# Patient Record
(demographics unavailable — no encounter records)

---

## 2024-10-21 NOTE — REASON FOR VISIT
[Follow-Up] : a follow-up visit [Asthma] : asthma [TextBox_44] : Overweight, HTN Cardiac disease,s/p hypokalemia

## 2024-10-21 NOTE — DISCUSSION/SUMMARY
[FreeTextEntry1] : Post for scheduled breast mastectomy redo plastic surgery did very well Will likely need umbilicus reconstruction pending Will see patient in December for clearance   Patient has had an issue on diuretic with hypokalemia Serum potassium has been increased Repeat potassium normal as noted above  There are no medical pulmonary contraindications to undergo scheduled above-noted procedures  Patient should also be treated with DVT PE protection heparin based on prior history of embolic disease No recent use of systemic corticosteroids Status post cardiology review no contraindications   Hypothyroid  inc synthroid 150  mcg QD HLD on generic  crestor Asthma hx COVID  Hx RSV Behavioral health  Disorder depression / anxiety Overweight  feels with weight will help self estem and overall feeling  exercise  avoid ETOH watch and trend weight Continue to monitor She has rescue inhaler Notify of any wheezing.  Notify if rescue inhaler is needed greater than 2-3 times in any week.  Avoid known triggers.

## 2024-10-21 NOTE — PHYSICAL EXAM
[Normal Oropharynx] : normal oropharynx [II] : Mallampati Class: II [Normal Appearance] : normal appearance [Supple] : supple [No JVD] : no jvd [Normal Rate/Rhythm] : normal rate/rhythm [Normal S1, S2] : normal s1, s2 [No Murmurs] : no murmurs [No Rubs] : no rubs [No Gallops] : no gallops [No Resp Distress] : no resp distress [No Acc Muscle Use] : no acc muscle use [Normal Palpation] : normal palpation [Normal Rhythm and Effort] : normal rhythm and effort [Clear to Auscultation Bilaterally] : clear to auscultation bilaterally [No Abnormalities] : no abnormalities [Benign] : benign [Not Tender] : not tender [Soft] : soft [No HSM] : no hsm [Normal Bowel Sounds] : normal bowel sounds [Normal Gait] : normal gait [No Clubbing] : no clubbing [No Cyanosis] : no cyanosis [No Edema] : no edema [No Focal Deficits] : no focal deficits [Oriented x3] : oriented x3 [Normal Affect] : normal affect

## 2024-10-21 NOTE — PROCEDURE
[FreeTextEntry1] : NIOX 24 normal range October 21, 2024 October 21, 2024 Spirometry flow rates are normal No decline in overall pulmonary physiology  NIOX 23 normal range July 8, 2024 Spirometry July 8, 2024 Normal spirometric flow rates No bronchodilator sponsor in FEV1 Preop surgical testing CBC normal range WBC 7.56 hemoglobin 13.5 hematocrit 39.9 platelet count 280,000 Serum electrolytes normal Creatinine 0.67 Serum potassium 3.3   NIOX  21  ppb WNL  3/4/24  PFT 3/4/24  [place] Nl flow rates  No BD at FEV!  Lung Volumes nl range  DLCO 120 % pred WNL HGB 14.2 stable / normal pulmonary physiology  Blood draw to recheck serum potassium Received preop laboratory data Noted serum potassium 3.1 Renal function normal creatinine 0.84 CBC normal Chest x-ray 3 March 4, 2024 Cardiac size normal AICD identified Sutures overlying left upper chest wall No parenchymal infiltrate pleural effusion or dominant pulmonary nodule Soft tissue bony structures unremarkable   NIOX  38 ppb hx asthma 12/18/23  [place] No BD 12/18/23  normal flow rates  NIOX  24  ppb WNL  8/28/23 [place] No BD  nl flow rates  PFT 5/18/23  flow  rates nl  No BD at FEV!  Lung Volumes  nl  DLCO 114 % HGB 13.2 NIOX  30  ppb mild  bronchial inflammation  Chest x-ray PA lateral May 18, 2023 Cardiac size normal Positive left upper lateral sutures identified post breast surgery AICD defibrillator in place Lung fields are clear No parenchymal infiltrates pleural effusions or dominant pulmonary nodules   Spirometry no bronchodilator January 5,2023 Flow rates normal Mild obstructive ventilatory impairment Ex 29 PPD January 5, 2023 consistent with mild bronchial inflammation  PFT 2/26/22 Well preserved flow rates mild OAD ratio 75 TLC 1213 % pred  Lung volumes nl DLCO 99 % nl HGB 13.7  EKG February 2, 2022 Heart rate of 104 Otherwise normal no acute changes no arrhythmias  Chest x-ray PA lateral February 2, 2022 Normal cardiac size Sutures overlying left chest wall consistent with prior breast surgery Single-chamber AICD identified Clear lung fields No evidence of parenchymal infiltrates pleural effusions dominant pulmonary nodules No evidence for residual Covid pneumonia   Chest x-ray PA lateral October 19, 2020 Normal cardiac size Sutures overlying left upper chest wall Single-chamber cardiac device most consistent with AICD Implants identified Clear lungs without parenchymal infiltrates pleural effusions dominant pulmonary nodules No evidence for adenopathy in the hilum appreciated Impression AICD   nitric oxide 62 2/25 2020  PFT February 25, 2020 Spirometry normal flow rates Mild obstructive pattern Borderline 10% response to bronchodilator at the FEV1 Lung volumes normal range Diffusion normal 170% predicted. Hemoglobin 13.4  Chest x-ray PA lateral projection February 25, 2020 Normal cardiac size Sutures left upper overlying chest Single-chamber cardiac device consistent with an AICD Overall clear lung fields without parenchymal infiltrates pleural effusions dominant pulmonary nodules Soft tissue bony structures grossly normal Shruthi mediastinum normal

## 2024-10-21 NOTE — HISTORY OF PRESENT ILLNESS
[Never] : never [TextBox_4] : 60 -year-old female Status post weight loss Did have follow-up with GI Patient did complete her breast reconstruction She will need possible additional plastic surgery for embolic umbilicus reconstruction as well Asthma is doing well without issues Chronic persistent asthma no exacerbations flare systemic steroids Patient is scheduled for plastic surgery redo breast mastectomy Patient will also undergo AICD replacement Patient has an issue with hypokalemia States she spoke to the plastic surgical team who said they can preop give her an IV infusion of serum potassium if needed Obesity but states doing well with phentermine Depression component of anxiety interment has helped improve his symptomatology as well as her overall mental status and work activities with better focus  CAD ASHD management cardiology Cardiac arrhythmias defibrillator History of RSV History of a COVID-19 infection History of breast cancer breast reconstruction Hypertension Hypothyroidism pos  weight loss on Wegovy

## 2024-10-21 NOTE — REVIEW OF SYSTEMS
[Fatigue] : fatigue [Depression] : depression [Anxiety] : anxiety [Negative] : Dermatologic [Cough] : no cough [Hemoptysis] : no hemoptysis [Chest Tightness] : no chest tightness [Sputum] : no sputum [Wheezing] : no wheezing [Diabetes] : no diabetes [TextBox_44] : cardiac  arrythmia [TextBox_113] : Breast Cancer not  active [TextBox_144] : Hypothyroidism

## 2024-12-18 NOTE — ASSESSMENT
[FreeTextEntry1] : Prior note nurse practitioner Sammy April 20, 2023::  This is a 58-year-old female with past medical history significant for hypertension, "new left bundle branch block", asthma, mild coronary artery calcifications from CAT scan January 5, 2022, obesity, status post COVID-19 infection October 2021, in November 2022,, hypothyroidism, history of supraventricular tachycardia, history of DVT (followed by vascular surgery), history of breast cancer status post bilateral mastectomy, status post hysterectomy, who comes in for cardiac consultation.  She denies chest pain, shortness of breath, dizziness or syncope. She has no history of rheumatic fever.  She does not drink excessive caffeine or alcohol. Cardiac risk factors include hypertension, and family history of atherosclerotic heart disease (a father had a heart attack at age 46).  She is followed by Dr. Alston pulmonary medicine.  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  CHIEF COMPLAINT: Today she is feeling generally well but does admit that she does have occasional chest pressure/chest pains but thinks this may be related to stress.  She is also interested in weight loss injectable medication.  Her BMI 32.49.  She may be a candidate for Wegovy at this time.  She states that she has a strong family history for coronary artery disease and is very interested in her risk for coronary artery disease.  She is currently prescribed aspirin 81 mg daily, diltiazem  mg 1 capsule daily, furosemide 40 mg daily and rosuvastatin 20 mg daily  She denies fever, chills, weight loss, malaise, rash, alteration bowel habits, weakness, abdominal  pain, bloating, changes in urination, visual disturbances, chest pain, headaches, dizziness, heart palpitations, recent episodes of syncope or falls at this time.  BLOOD PRESSURE: -BP is borderline elevated on today's exam however she states that she is having some stress work related at this time.  BLOOD WORK: -New blood work was done 04/20/2023 to evaluate lipid profile, CBC, BMP, hepatic function, A1C and TSH. -Blood work was done February 15, 2023 demonstrated total cholesterol of 255, triglycerides 142 HDL 63 and direct LDL of 160. Lipoprotein a elevated at 148.9 lipoprotein B elevated at 125 TSH elevated at 4.67.  C-reactive protein elevated 12.76.  Her A1c 5.7%.  TESTING/REPORTS: -EKG done Apr 20, 2023 which demonstrated regular sinus rhythm with nonspecific ST-T wave changes, BPM of 90 with LBBB.  -Electrocardiogram done February 15, 2023 demonstrates normal sinus rhythm rate 94 bpm is otherwise remarkable for left bundle branch block, left axis deviation, left atrial abnormality and poor R wave progression.  PMH: The patient was complaining of left-sided chest wall pain February 9, 2023.  The pain was worse with respiration and she went to urgent care.  She was sent to the emergency room at [facility] and she was found to have a new left bundle branch block. Chest pain necessitating visit to urgent care and ultimately the emergency room last week was musculoskeletal in nature.  Every time she took her breast she felt the chest pain.  She also had some pain in the scapular area suggestive of musculoskeletal pain as well.  She feels tightness in her chest sometimes related to the bilateral mastectomy and reconstruction surgery.  She had a pharmacologic nuclear stress test and echo Doppler examination during her hospitalization and was sent home for outpatient follow-up and evaluation.  PMH: The patient received a defibrillator/pacemaker in 2004 by Dr. Tang at [facility].  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  She reports that she was given a defibrillator because 1 sister had a cardiac arrest and ultimately both sisters were diagnosed with cardiomyopathy.  The patient was also found to have an episode of nonsustained ventricular tachycardia. She is followed by Dr. Puckett of electrophysiology. The patient reports that she is unable to take beta-blocker therapy secondary to severe asthma. The patient was concerned about the new left bundle branch block noted during the hospitalization of February 9, 2023. I have reassured her that she has a pacemaker defibrillator in place and she would follow-up with the electrophysiology team for monitoring, and she does have a functional right bundle branch to conduct her electrical signals. The patient also said at some point she thought she had "atrial fibrillation", but I did not find any such history in her record other than episode of paroxysmal atrial tachycardia. I have asked her to discuss this further with Dr. Puckett.  PLAN: -The patient will proceed with a CCTA with FFR to evaluate her risk for coronary artery disease.  Patient states she did have a pharmacologic nuclear stress test done during her hospitalization however we do not have these records to review at this time. -We will start the process for her to be on Wegovy and she understands to call our office once the pharmacy approves the authorization for initial demonstration of the 0.25 mg dose. -She still has not followed up with electrophysiologist Dr. Puckett as directed by Dr. Rosas however is not symptomatic at this time in regards to palpitations. -The patient will schedule an Echo Doppler examination to evaluate murmur, left ventricular function, chamber size, and rule out hypertrophy.  -She will continue with her usual medications and will contact the office if she is having any complaints between now and their next follow up appointment.  I have discussed the plan of care with Ms. LEIGHA GEE  and she  will follow up in 3 months. She is compliant with all of her medications. The patient understands that aerobic exercises must be increased to at least 20 minutes 4 times/week along with maintaining lifestyle modifications including well-maintained diet. She will contact me at the office for any questions with their care or any changes in their health status.  Wisam BRUNO

## 2024-12-18 NOTE — DISCUSSION/SUMMARY
[FreeTextEntry1] : This is a 60-year-old female with past medical history significant for coronary artery calcification, elevated lipoprotein a (203 nmol/L October 1, 2024), hypertension, "new left bundle branch block", which apparently has resolved, asthma, mild coronary artery calcifications from CAT scan January 5, 2022, obesity, status post COVID-19 infection October 2021, in November 2022,, status post AICD, hypothyroidism, history of supraventricular tachycardia, history of DVT (followed by vascular surgery), history of breast cancer status post bilateral mastectomy, status post hysterectomy, who comes in for preoperative cardiac evaluation. The patient is scheduled for revision of bellybutton by plastic surgery in the next month. The patient had revision of breast implantation, and improvement in her AICD pocket on March 13, 2024. She denies chest pain, shortness of breath, dizziness or syncope. Cardiac risk factors include hypertension, and family history of atherosclerotic heart disease (a father had a heart attack at age 46). Electrocardiogram done December 17, 2024 demonstrated normal sinus rhythm rate 75 bpm is otherwise remarkable for 1 premature ventricular contraction and nonspecific ST wave changes. Lipid panel done October 1, 2024 demonstrated cholesterol 164, HDL 74, triglycerides 106, LDL calculated 72, non-HDL cholesterol 91 mg/dL, LDL direct 73 mg/dL and lipoprotein B of 77 mg/dL with hemoglobin A1c of 5.1.  Lipoprotein a was elevated at 203 nmol/L. Echo Doppler examination done October 1, 2024 demonstrated satisfactory left ventricular function with estimated ejection fraction of 59%, with a range of 55 to 60%, mild mitral valve regurgitation, mild tricuspid valve regurgitation, trace aortic valve regurgitation, trace pulmonic valve regurgitation, device lead seen in the right heart and into atrial septal wall aneurysm.  The patient will have new blood work done today for electrolytes, lipid panel, TSH, hemoglobin A1c and SMA 20. The patient is lost approximately 70 pounds on Wegovy therapy currently at a 2.4 mg dosage weekly.  She continues to watch her diet and is working with weight watchers to maintain a prudent caloric intake. Her blood pressure is under good control The patient had successful surgery July 19, 2024 by her plastic surgeon related to breast revision and abdominal hernia repair. Electrocardiogram done June 25, 2024 demonstrated normal sinus rhythm rate of 87 bpm is otherwise remarkable for left atrial abnormality and 1 premature ventricular contraction. The patient will follow-up with Dr. Puckett of electrophysiology. She is currently hemodynamically stable and asymptomatic from a cardiac standpoint. Electrocardiogram done January 23, 2024 demonstrated normal sinus rhythm rate of 86 bpm otherwise remarkable for 1 premature ventricular contraction. Echo Doppler examination done July 19, 2023 demonstrated estimated ejection fraction of 55% with minimal tricuspid valve regurgitation, minimal to mild mitral valve regurgitation, thickened mitral valve leaflets, thinning of the interatrial septum, device lead seen in the right heart and thickening of the aortic valve leaflets. Blood work done March 4, 2024 demonstrated potassium of 3.3, sodium 141, chloride of 100, CO2 25, creatinine 0.73, GFR 95 cc/min. She is currently hemodynamically stable and asymptomatic from a cardiac standpoint. Electrocardiogram done October 3, 2023 demonstrated normal sinus rhythm rate of 82 bpm and is otherwise unremarkable. Lipid panel done April 20, 2023 demonstrated a cholesterol 179, HDL 63, triglycerides 159, LDL direct of 89 mg/dL and non-HDL cholesterol 116 mg/dL with hemoglobin A1c of 5.6. She will continue on her current diet and exercise program. Cardiac risk factors include hypertension, and family history of atherosclerotic heart disease (a father had a heart attack at age 46). Coronary CTA done August 2, 2023 which demonstrated a calcium score of 92 units all in the left anterior descending artery, and angiography analysis demonstrated minimal calcified plaque in the proximal left anterior descending artery of less than 25%, and mild plaque in the first diagonal branch. The patient will continue on her current diet and exercise program.   Echo Doppler examination done July 19, 2023 demonstrated minimal to mild mitral valve regurgitation, minimal tricuspid valve regurgitation, thinning of the interatrial septum, with estimated ejection fraction of 55%. Electrocardiogram done August 1, 2023 demonstrates normal sinus rhythm rate 92 bpm is otherwise unremarkable.  (Left bundle branch block has resolved) She remain on her dose of Crestor 20 mg daily.  Lipid profile done April 20, 2023 demonstrated cholesterol 170, HDL 63, triglycerides 159, LDL direct 89, non-HDL cholesterol 116 mg/dL and hemoglobin A1c of 5.6. Echo Doppler examination done July 19, 2023 demonstrated satisfactory left ventricular function with estimated ejection fraction of 55% and a range of 55 to 60%, minimal to mild mitral valve regurgitation, minimal tricuspid valve regurgitation, minimal thickened aortic valve leaflets and mitral valve leaflets with thinning of the interatrial septum.  A device lead was noted in the right heart.  Electrocardiogram done February 15, 2023 demonstrates normal sinus rhythm rate 94 bpm is otherwise remarkable for left bundle branch block, left axis deviation, left atrial abnormality and poor R wave progression. PMH: The patient was complaining of left-sided chest wall pain February 9, 2023.  The pain was worse with respiration and she went to urgent care.  She was sent to the emergency room at [facility] and she was found to have a new left bundle branch block.  The patient received a defibrillator/pacemaker in 2004 by Dr. Tang at [facility].  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  She reports that she was given a defibrillator because 1 sister had a cardiac arrest and ultimately both sisters were diagnosed with cardiomyopathy.  The patient was also found to have an episode of nonsustained ventricular tachycardia. She is followed by Dr. Puckett of electrophysiology. The patient reports that she is unable to take beta-blocker therapy secondary to severe asthma. The patient was concerned about the new left bundle branch block noted during the hospitalization of February 9, 2023.  I have reassured her that she has a pacemaker defibrillator in place and she would follow-up with the electrophysiology team for monitoring, and she does have a functional right bundle branch to conduct her electrical signals. The patient also said at some point she thought she had "atrial fibrillation", but I did not find any such history in her record other than episode of paroxysmal atrial tachycardia.   The patient is clear from a cardiac standpoint for surgery.  Please note the patient has an AICD in place and you may contact her electrophysiology team. She has satisfactory left ventricular function with estimated ejection fraction of 59%.  Please avoid overhydration.  Maintain prophylaxis for deep venous thrombosis.  The patient should have an incentive spirometer in the perioperative period.

## 2024-12-18 NOTE — REASON FOR VISIT
[FreeTextEntry3] : Dr. Garcia Trust [FreeTextEntry1] : This is a 60-year-old female who comes in for follow-up cardiac evaluation.  The patient had successful breast and AICD reconstruction surgery at [facility] on 3/13/24, as well as July 2024. Pt is here for preoperative clearance for umbilical repair, belly button, and breast reconstruction scheduled for 1/8/25 at [facility] with Dr. Isabel. Past medical history significant for hypertension, left bundle branch block, asthma, mild coronary artery calcifications from CAT scan 1/5/22, status post COVID-19 infection October 2021, in November 2022, hypothyroidism, history of supraventricular tachycardia, status post AICD placement 2016 complicated by DVT (followed by vascular surgery), history of breast cancer status post bilateral mastectomy, status post hysterectomy.  Cardiac risk factors include hypertension, and family history of atherosclerotic heart disease (a father had a heart attack at age 46). Social history: She works as a registered nurse at [employer]. She does not drink excessive caffeine or alcohol. She is followed by Dr. Alston pulmonary medicine. She denies chest pain, shortness of breath, dizziness or syncope.  She is currently prescribed Potassium chloride 10 MEQ, Aspirin 81 mg daily, Diltiazem  mg 1 capsule daily, Furosemide 40 mg daily, Rosuvastatin 20 mg daily, and Wegovy 2.4 mg weekly.   She has lost over 45 pounds since starting Wegovy in May 2023.   EKG 1/23/24 demonstrated sinus rhythm with rate 86 and one PVC.   Pt is cleared for surgery on cardiac standpoint.

## 2024-12-19 NOTE — HISTORY OF PRESENT ILLNESS
[Never] : never [TextBox_4] : 60 -year-old female no asthma  sxs PREOP  plastic surgery for umbilical repair Status post weight loss Did have follow-up with GI Patient did complete her breast reconstruction She will need possible additional plastic surgery for embolic umbilicus reconstruction as well Asthma is doing well without issues Chronic persistent asthma no exacerbations flare systemic steroids Patient is scheduled for plastic surgery redo breast mastectomy Patient will also undergo AICD replacement Patient has an issue with hypokalemia States she spoke to the plastic surgical team who said they can preop give her an IV infusion of serum potassium if needed Obesity but states doing well with phentermine Depression component of anxiety interment has helped improve his symptomatology as well as her overall mental status and work activities with better focus  CAD ASHD management cardiology Cardiac arrhythmias defibrillator History of RSV History of a COVID-19 infection History of breast cancer breast reconstruction Hypertension Hypothyroidism pos  weight loss on Wegovy

## 2024-12-19 NOTE — DISCUSSION/SUMMARY
[FreeTextEntry1] : Post for scheduled breast mastectomy redo plastic surgery did very well Will likely need umbilicus reconstruction pending PATIENT CLEARED PULMONARY FOR PLASTIC  SURGERY  Patient has had an issue on diuretic with hypokalemia Serum potassium has been increased Repeat potassium normal as noted above  There are no medical pulmonary contraindications to undergo scheduled above-noted procedures  Patient should also be treated with DVT PE protection heparin based on prior history of embolic disease No recent use of systemic corticosteroids Status post cardiology review no contraindications   Hypothyroid  inc synthroid 150  mcg QD HLD on generic  crestor Asthma hx COVID  Hx RSV Behavioral health  Disorder depression / anxiety Overweight  feels with weight will help self estem and overall feeling  exercise  avoid ETOH watch and trend weight Continue to monitor She has rescue inhaler Notify of any wheezing.  Notify if rescue inhaler is needed greater than 2-3 times in any week.  Avoid known triggers.

## 2025-03-13 NOTE — DISCUSSION/SUMMARY
[FreeTextEntry1] : Device function is normal with adequate safety margins.  =0%.    9.4 years  f/u remote in 6 mothhs  1 year in [facility]

## 2025-03-13 NOTE — PROCEDURE
[de-identified] : Abbott [de-identified] : Gabriel [de-identified] : [id number] [de-identified] : 3/13/2024

## 2025-03-13 NOTE — DISCUSSION/SUMMARY
[FreeTextEntry1] : Device function is normal with adequate safety margins.  =0%.    9.4 years  f/u remote in 6 mothhs  1 year in [facility]

## 2025-03-13 NOTE — PROCEDURE
[de-identified] : Abbott [de-identified] : Gabriel [de-identified] : [id number] [de-identified] : 3/13/2024

## 2025-03-13 NOTE — DISCUSSION/SUMMARY
[FreeTextEntry1] : Device function is normal with adequate safety margins.  =0%.    9.4 years  f/u remote in 6 mothhs  1 year in [facility]

## 2025-03-13 NOTE — PROCEDURE
[de-identified] : Abbott [de-identified] : Gabriel [de-identified] : [id number] [de-identified] : 3/13/2024

## 2025-04-14 NOTE — HISTORY OF PRESENT ILLNESS
[Never] : never [TextBox_4] : 60 -year-old female no asthma  sxs\plan additional plastic  surgery breast reconstruction f/u Post op plastic surgery for umbilical repair Status post weight loss Did have follow-up with GI Patient did complete her breast reconstruction She will need possible additional plastic surgery for embolic umbilicus reconstruction as well Asthma is doing well without issues Chronic persistent asthma no exacerbations flare systemic steroids Patient is scheduled for plastic surgery redo breast mastectomy Patient will also undergo AICD replacement Patient has an issue with hypokalemia States she spoke to the plastic surgical team who said they can preop give her an IV infusion of serum potassium if needed Obesity but states doing well with phentermine Depression component of anxiety interment has helped improve his symptomatology as well as her overall mental status and work activities with better focus  CAD ASHD management cardiology Cardiac arrhythmias defibrillator History of RSV History of a COVID-19 infection History of breast cancer breast reconstruction Hypertension Hypothyroidism pos  weight loss on Wegovy

## 2025-04-14 NOTE — PROCEDURE
[FreeTextEntry1] : NIOX 24 interval improvement upper limits normal range April 14, 2025 Spirometry April 14, 2025 indication asthma Very mild obstructive ventilatory impairment Well-preserved flow rates No bronchodilator sponsor FEV1 70% bronchodilator sponsor small airways No decline in overall pulmonary physiology  DMITRI No BD 12/19/24  flow  rates WNL  minimal OAD NIOX 24 normal range October 21, 2024 October 21, 2024 Spirometry flow rates are normal No decline in overall pulmonary physiology  NIOX 23 normal range July 8, 2024 Spirometry July 8, 2024 Normal spirometric flow rates No bronchodilator sponsor in FEV1 Preop surgical testing CBC normal range WBC 7.56 hemoglobin 13.5 hematocrit 39.9 platelet count 280,000 Serum electrolytes normal Creatinine 0.67 Serum potassium 3.3   NIOX  21  ppb WNL  3/4/24  PFT 3/4/24  [place] Nl flow rates  No BD at FEV!  Lung Volumes nl range  DLCO 120 % pred WNL HGB 14.2 stable / normal pulmonary physiology  Blood draw to recheck serum potassium Received preop laboratory data Noted serum potassium 3.1 Renal function normal creatinine 0.84 CBC normal Chest x-ray 3 March 4, 2024 Cardiac size normal AICD identified Sutures overlying left upper chest wall No parenchymal infiltrate pleural effusion or dominant pulmonary nodule Soft tissue bony structures unremarkable   NIOX  38 ppb hx asthma 12/18/23  Dmitri No BD 12/18/23  normal flow rates  NIOX  24  ppb WNL  8/28/23 Dmitri No BD  nl flow rates  PFT 5/18/23  flow  rates nl  No BD at FEV!  Lung Volumes  nl  DLCO 114 % HGB 13.2 NIOX  30  ppb mild  bronchial inflammation  Chest x-ray PA lateral May 18, 2023 Cardiac size normal Positive left upper lateral sutures identified post breast surgery AICD defibrillator in place Lung fields are clear No parenchymal infiltrates pleural effusions or dominant pulmonary nodules   Spirometry no bronchodilator January 5,2023 Flow rates normal Mild obstructive ventilatory impairment Ex 29 PPD January 5, 2023 consistent with mild bronchial inflammation  PFT 2/26/22 Well preserved flow rates mild OAD ratio 75 TLC 1213 % pred  Lung volumes nl DLCO 99 % nl HGB 13.7  EKG February 2, 2022 Heart rate of 104 Otherwise normal no acute changes no arrhythmias  Chest x-ray PA lateral February 2, 2022 Normal cardiac size Sutures overlying left chest wall consistent with prior breast surgery Single-chamber AICD identified Clear lung fields No evidence of parenchymal infiltrates pleural effusions dominant pulmonary nodules No evidence for residual Covid pneumonia   Chest x-ray PA lateral October 19, 2020 Normal cardiac size Sutures overlying left upper chest wall Single-chamber cardiac device most consistent with AICD Implants identified Clear lungs without parenchymal infiltrates pleural effusions dominant pulmonary nodules No evidence for adenopathy in the hilum appreciated Impression AICD   nitric oxide 62 2/25 2020  PFT February 25, 2020 Spirometry normal flow rates Mild obstructive pattern Borderline 10% response to bronchodilator at the FEV1 Lung volumes normal range Diffusion normal 170% predicted. Hemoglobin 13.4  Chest x-ray PA lateral projection February 25, 2020 Normal cardiac size Sutures left upper overlying chest Single-chamber cardiac device consistent with an AICD Overall clear lung fields without parenchymal infiltrates pleural effusions dominant pulmonary nodules Soft tissue bony structures grossly normal Shruthi mediastinum normal

## 2025-07-29 NOTE — ASSESSMENT
[FreeTextEntry1] : This is a 60-year-old female with past medical history significant for hypertension, left bundle branch block, mild coronary artery calcifications, Lipoprotein a elevation 203 nmol/L October 1, 2024), obesity, hypothyroidism, history of supraventricular tachycardia s/p AICD/pacemaker in 2004 by Dr. Tang at [facility], history of DVT (followed by vascular surgery), history of breast cancer s/p bilateral mastectomy, s/p hysterectomy, who comes in for cardiac consultation.  She has no history of rheumatic fever.  She does not drink excessive caffeine or alcohol.  Cardiac risk factors include hypertension, and family history of atherosclerotic heart disease (a father had a heart attack at age 46).  She is followed by Dr. Alston pulmonary medicine.  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  HPI: Presents for follow-up for reevaluation of clinical status after discontinuation of her furosemide and potassium replacement therapy at last visit. Formerly on Furosemide 40 mg daily and Potassium 10 MEQs 3 tablets Sat & Sun and 2 tablets Mon-Fri daily. Reports that she is now taking Furosemide 20 mg daily and Potassium 10 MEQs daily. New blood work was done 07/29/2025 to evaluate lipid profile, CBC, BMP, hepatic function, A1C and TSH.  She is feeling generally well today and denies chest pain, dizziness, heart palpitations, recent episodes of syncope or falls, SOB, or dyspnea at this time.  Current Medications: Aspirin 81 mg daily, Diltiazem  mg 1 capsule daily, Furosemide 20 mg daily, Potassium 10 MEQs daily, Rosuvastatin 20 mg daily, and Wegovy 1.7 mg injection weekly.   She is spacing out her Wegovy 1.7 mg injections Q10 days and would like to hold off on increasing to the 2.4 mg dose for now.     BLOOD PRESSURE: Stable    BLOOD WORK:  *LDL target goal < 70* -New blood work was done 07/29/2025 to evaluate lipid profile, CBC, BMP, hepatic function, A1C and TSH. -Blood work on October 1, 2024 demonstrated potassium 3.2, cholesterol 164, HDL 74, triglycerides 106, LDL calculated 72, non-HDL cholesterol 91, LDL direct 73 mg/dL and lipoprotein B70 7 mg/dL with hemoglobin A1c of 5.1.   TESTING/REPORTS: -EKG done 03/11/2025 demonstrated regular sinus rhythm rate 94 bpm otherwise remarkable for left atrial abnormality.   -Electrocardiogram done December 17, 2024 demonstrated normal sinus rhythm rate 75 bpm is otherwise remarkable for 1 premature ventricular contraction and nonspecific ST wave changes.  -Echo Doppler examination done October 1, 2024 demonstrated estimated ejection fraction of 59% with a range of 55% to 60%, mild mitral valve regurgitation, mild tricuspid valve regurgitation, trace aortic valve regurgitation, device lead in the right heart, trace pulmonic valve regurgitation aneurysmal interatrial septum and aortic valve sclerosis.  -Electrocardiogram done June 25, 2024 demonstrated normal sinus rhythm rate of 87 bpm is otherwise remarkable for left atrial abnormality and 1 premature ventricular contraction.  -Electrocardiogram done January 23, 2024 demonstrated normal sinus rhythm rate of 86 bpm otherwise remarkable for 1 premature ventricular contraction.  -CT Angiogram done April 2203 demonstrated total Agatston score of 92 units without significant stenosis.   -EKG done Apr 20, 2023 which demonstrated regular sinus rhythm with nonspecific ST-T wave changes, BPM of 90 with LBBB.  -Electrocardiogram done February 15, 2023 demonstrates normal sinus rhythm rate 94 bpm is otherwise remarkable for left bundle branch block, left axis deviation, left atrial abnormality and poor R wave progression.  -PMH: The patient was complaining of left-sided chest wall pain February 9, 2023.  The pain was worse with respiration, and she went to urgent care.  She was sent to the emergency room at [facility] and she was found to have a new left bundle branch block. Chest pain necessitating visit to urgent care and ultimately the emergency room last week was musculoskeletal in nature.  Every time she took her breast she felt the chest pain.  She also had some pain in the scapular area suggestive of musculoskeletal pain as well.  She feels tightness in her chest sometimes related to the bilateral mastectomy and reconstruction surgery. She had a pharmacologic nuclear stress test and echo Doppler examination during her hospitalization and was sent home for outpatient follow-up and evaluation.  -PMH: The patient received a defibrillator/pacemaker in 2004 by Dr. Tang at [facility].  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  She reports that she was given a defibrillator because 1 sister had a cardiac arrest and ultimately both sisters were diagnosed with cardiomyopathy.  The patient was also found to have an episode of non-sustained ventricular tachycardia. She is now followed by Dr. Puckett of electrophysiology. The patient reports that she is unable to take beta-blocker therapy secondary to severe asthma. The patient was concerned about the new left bundle branch block noted during the hospitalization of February 9, 2023. I have reassured her that she has a pacemaker defibrillator in place and she would follow-up with the electrophysiology team for monitoring, and she does have a functional right bundle branch to conduct her electrical signals. The patient also said at some point she thought she had "atrial fibrillation", but I did not find any such history in her record other than episode of paroxysmal atrial tachycardia.   PLAN: -She will continue her current medications and contact the office if problems arise before next follow-up appointment. -New blood work was done 07/29/2025 to evaluate lipid profile, CBC, BMP, hepatic function, A1C and TSH. -Continue follow-up with specialists.   I have discussed the plan of care with LEIGHA GEE and she will follow up in 3-4 months. They are compliant with all of their medications.     The patient understands that aerobic exercises must be increased to 40 minutes 4 times/week and a detailed discussion of lifestyle modification was done today.   The patient has a good understanding of the diagnosis, treatment plan and lifestyle modification.   They will contact me at the office for any questions with their care or any changes in their health status.   The patient was discussed with supervision physician Dr. Brandon Rosas at the time of the visit and the plan of care will be carried out as noted above.     JOE Hess NP

## 2025-07-29 NOTE — REASON FOR VISIT
[CV Risk Factors and Non-Cardiac Disease] : CV risk factors and non-cardiac disease [FreeTextEntry3] : Dr. Garcia Trust [FreeTextEntry1] : This is a 60-year-old female who comes in for follow-up cardiac evaluation.  The patient had successful breast and AICD reconstruction surgery at [facility] on 3/13/24, as well as July 2024.   Pt is here for preoperative clearance for additional plastic surgery.  She already had for umbilical repair, belly button, and breast reconstruction scheduled for 1/8/25 at [facility] with Dr. Isabel. Past medical history significant for hypertension, left bundle branch block, asthma, mild coronary artery calcifications from CAT scan 1/5/22, status post COVID-19 infection October 2021, in November 2022, hypothyroidism, history of supraventricular tachycardia, status post AICD placement 2016 complicated by DVT (followed by vascular surgery), history of breast cancer status post bilateral mastectomy, status post hysterectomy.  Cardiac risk factors include hypertension, and family history of atherosclerotic heart disease (a father had a heart attack at age 46). Social history: She works as a registered nurse at [employer]. She does not drink excessive caffeine or alcohol. She is followed by Dr. Alston pulmonary medicine. She denies chest pain, shortness of breath, dizziness or syncope.  She is currently prescribed Potassium chloride 10 MEQ, Aspirin 81 mg daily, Diltiazem  mg 1 capsule daily, Furosemide 40 mg daily, Rosuvastatin 20 mg daily, and Wegovy 2.4 mg weekly.   She has lost over 45 pounds since starting Wegovy in May 2023.   EKG 1/23/24 demonstrated sinus rhythm with rate 86 and one PVC.   Pt is cleared for surgery on cardiac standpoint.

## 2025-07-29 NOTE — DISCUSSION/SUMMARY
[FreeTextEntry1] : Dr. Rosas-(PRIOR VISIT and PMH WITH Dr. Rosas):  Due to a computer glitch vital signs cannot be entered in the usual location Blood pressure 120/73, pulse is 70 and regular respirations 16 weight 153 pounds height 5 foot 9, O2 saturation 99%.  This is a 60-year-old female with past medical history significant for coronary artery calcification, elevated lipoprotein a (203 nmol/L October 1, 2024), hypertension, "new left bundle branch block", which apparently has resolved, asthma, mild coronary artery calcifications from CAT scan January 5, 2022, obesity, status post COVID-19 infection October 2021, in November 2022,, status post AICD, hypothyroidism, history of supraventricular tachycardia, history of DVT (followed by vascular surgery), history of breast cancer status post bilateral mastectomy, status post hysterectomy, who comes in for preoperative cardiac evaluation. The patient already had a revision of bellybutton by plastic surgery. The patient had revision of breast implantation, and improvement in her AICD pocket on March 13, 2024. She denies chest pain, shortness of breath, dizziness or syncope. Cardiac risk factors include hypertension, and family history of atherosclerotic heart disease (a father had a heart attack at age 46). Blood work on October 1, 2024 demonstrated potassium 3.2, cholesterol 164, HDL 74, triglycerides 106, LDL calculated 72, non-HDL cholesterol 91, LDL direct 73 mg/dL and lipoprotein B70 7 mg/dL with hemoglobin A1c of 5.1. The patient has been taking potassium 10 mEq daily.  She had preoperative blood work done in the last 2 weeks and reported potassium level of 3.3. I have asked her to double her potassium tablets, to 2 tablets daily through her perioperative period. Then I have asked her to discontinue her furosemide and potassium replacement therapy. She will then have a follow-up appointment with us to reevaluate her clinical status to see if she needs daily diuretic therapy.  She clearly is sensitive to diuretic therapy and developed hypokalemia. Echo Doppler examination done October 1, 2024 demonstrated estimated ejection fraction of 59% with a range of 55% to 60%, mild mitral valve regurgitation, mild tricuspid valve regurgitation, trace aortic valve regurgitation, device lead in the right heart, trace pulmonic valve regurgitation aneurysmal interatrial septum and aortic valve sclerosis. Electrocardiogram done December 17, 2024 demonstrated normal sinus rhythm rate 75 bpm is otherwise remarkable for 1 premature ventricular contraction and nonspecific ST wave changes. Lipid panel done October 1, 2024 demonstrated cholesterol 164, HDL 74, triglycerides 106, LDL calculated 72, non-HDL cholesterol 91 mg/dL, LDL direct 73 mg/dL and lipoprotein B of 77 mg/dL with hemoglobin A1c of 5.1.  Lipoprotein a was elevated at 203 nmol/L. Echo Doppler examination done October 1, 2024 demonstrated satisfactory left ventricular function with estimated ejection fraction of 59%, with a range of 55 to 60%, mild mitral valve regurgitation, mild tricuspid valve regurgitation, trace aortic valve regurgitation, trace pulmonic valve regurgitation, device lead seen in the right heart and into atrial septal wall aneurysm. PMH: The patient has lost approximately 70 pounds on Wegovy therapy currently at a 2.4 mg dosage weekly.  She continues to watch her diet and is working with weight watchers to maintain a prudent caloric intake. Her blood pressure is under good control The patient had successful surgery July 19, 2024 by her plastic surgeon related to breast revision and abdominal hernia repair. Electrocardiogram done June 25, 2024 demonstrated normal sinus rhythm rate of 87 bpm is otherwise remarkable for left atrial abnormality and 1 premature ventricular contraction. The patient will follow-up with Dr. Puckett of electrophysiology. She is currently hemodynamically stable and asymptomatic from a cardiac standpoint. Electrocardiogram done January 23, 2024 demonstrated normal sinus rhythm rate of 86 bpm otherwise remarkable for 1 premature ventricular contraction. Echo Doppler examination done July 19, 2023 demonstrated estimated ejection fraction of 55% with minimal tricuspid valve regurgitation, minimal to mild mitral valve regurgitation, thickened mitral valve leaflets, thinning of the interatrial septum, device lead seen in the right heart and thickening of the aortic valve leaflets. Blood work done March 4, 2024 demonstrated potassium of 3.3, sodium 141, chloride of 100, CO2 25, creatinine 0.73, GFR 95 cc/min. She is currently hemodynamically stable and asymptomatic from a cardiac standpoint. Electrocardiogram done October 3, 2023 demonstrated normal sinus rhythm rate of 82 bpm and is otherwise unremarkable. Lipid panel done April 20, 2023 demonstrated a cholesterol 179, HDL 63, triglycerides 159, LDL direct of 89 mg/dL and non-HDL cholesterol 116 mg/dL with hemoglobin A1c of 5.6. She will continue on her current diet and exercise program. Cardiac risk factors include hypertension, and family history of atherosclerotic heart disease (a father had a heart attack at age 46). Coronary CTA done August 2, 2023 which demonstrated a calcium score of 92 units all in the left anterior descending artery, and angiography analysis demonstrated minimal calcified plaque in the proximal left anterior descending artery of less than 25%, and mild plaque in the first diagonal branch. The patient will continue on her current diet and exercise program.   Echo Doppler examination done July 19, 2023 demonstrated minimal to mild mitral valve regurgitation, minimal tricuspid valve regurgitation, thinning of the interatrial septum, with estimated ejection fraction of 55%. Electrocardiogram done August 1, 2023 demonstrates normal sinus rhythm rate 92 bpm is otherwise unremarkable.  (Left bundle branch block has resolved) She remain on her dose of Crestor 20 mg daily.  Lipid profile done April 20, 2023 demonstrated cholesterol 170, HDL 63, triglycerides 159, LDL direct 89, non-HDL cholesterol 116 mg/dL and hemoglobin A1c of 5.6. Echo Doppler examination done July 19, 2023 demonstrated satisfactory left ventricular function with estimated ejection fraction of 55% and a range of 55 to 60%, minimal to mild mitral valve regurgitation, minimal tricuspid valve regurgitation, minimal thickened aortic valve leaflets and mitral valve leaflets with thinning of the interatrial septum.  A device lead was noted in the right heart.  Electrocardiogram done February 15, 2023 demonstrates normal sinus rhythm rate 94 bpm is otherwise remarkable for left bundle branch block, left axis deviation, left atrial abnormality and poor R wave progression. PMH: The patient was complaining of left-sided chest wall pain February 9, 2023.  The pain was worse with respiration and she went to urgent care.  She was sent to the emergency room at [facility] and she was found to have a new left bundle branch block.  The patient received a defibrillator/pacemaker in 2004 by Dr. Tang at [facility].  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  She reports that she was given a defibrillator because 1 sister had a cardiac arrest and ultimately both sisters were diagnosed with cardiomyopathy.  The patient was also found to have an episode of nonsustained ventricular tachycardia. She is followed by Dr. Puckett of electrophysiology. The patient reports that she is unable to take beta-blocker therapy secondary to severe asthma. The patient was concerned about the new left bundle branch block noted during the hospitalization of February 9, 2023.  I have reassured her that she has a pacemaker defibrillator in place and she would follow-up with the electrophysiology team for monitoring, and she does have a functional right bundle branch to conduct her electrical signals. The patient also said at some point she thought she had "atrial fibrillation", but I did not find any such history in her record other than episode of paroxysmal atrial tachycardia.  Cardiac clearance May 5, 2025:: The patient is clear from a cardiac standpoint for surgery.  Please note the patient has an AICD in place and you may contact her electrophysiology team. She has satisfactory left ventricular function with estimated ejection fraction of 59%.  Please avoid overhydration.  Maintain prophylaxis for deep venous thrombosis.  The patient should have an incentive spirometer in the perioperative period.